# Patient Record
Sex: MALE | Race: WHITE | NOT HISPANIC OR LATINO | URBAN - METROPOLITAN AREA
[De-identification: names, ages, dates, MRNs, and addresses within clinical notes are randomized per-mention and may not be internally consistent; named-entity substitution may affect disease eponyms.]

---

## 2017-12-24 LAB
INFLUENZA A ANTIGEN, POC: POSITIVE
INFLUENZA B ANTIGEN, POC: NEGATIVE

## 2019-02-15 ENCOUNTER — HISTORICAL (OUTPATIENT)
Dept: ADMINISTRATIVE | Facility: HOSPITAL | Age: 49
End: 2019-02-15

## 2019-02-15 LAB
ABS NEUT (OLG): 3.2 X10(3)/MCL (ref 2.1–9.2)
ALBUMIN SERPL-MCNC: 4.5 GM/DL (ref 3.4–5)
ALBUMIN/GLOB SERPL: 2.25 {RATIO} (ref 1.5–2.5)
ALP SERPL-CCNC: 46 UNIT/L (ref 38–126)
ALT SERPL-CCNC: 16 UNIT/L (ref 7–52)
APPEARANCE, UA: CLEAR
AST SERPL-CCNC: 18 UNIT/L (ref 15–37)
BACTERIA #/AREA URNS AUTO: NORMAL /HPF
BILIRUB SERPL-MCNC: 0.8 MG/DL (ref 0.2–1)
BILIRUB UR QL STRIP: NEGATIVE MG/DL
BILIRUBIN DIRECT+TOT PNL SERPL-MCNC: 0.2 MG/DL (ref 0–0.5)
BILIRUBIN DIRECT+TOT PNL SERPL-MCNC: 0.6 MG/DL
BUN SERPL-MCNC: 18 MG/DL (ref 7–18)
CALCIUM SERPL-MCNC: 8.6 MG/DL (ref 8.5–10)
CHLORIDE SERPL-SCNC: 107 MMOL/L (ref 98–107)
CHOLEST SERPL-MCNC: 222 MG/DL (ref 0–200)
CHOLEST/HDLC SERPL: 4 {RATIO}
CO2 SERPL-SCNC: 28 MMOL/L (ref 21–32)
COLOR UR: YELLOW
CREAT SERPL-MCNC: 1.06 MG/DL (ref 0.6–1.3)
ERYTHROCYTE [DISTWIDTH] IN BLOOD BY AUTOMATED COUNT: 11.9 % (ref 11.5–17)
GLOBULIN SER-MCNC: 2 GM/DL (ref 1.2–3)
GLUCOSE (UA): NEGATIVE MG/DL
GLUCOSE SERPL-MCNC: 111 MG/DL (ref 74–106)
HCT VFR BLD AUTO: 42 % (ref 42–52)
HDLC SERPL-MCNC: 56 MG/DL (ref 35–60)
HGB BLD-MCNC: 13.8 GM/DL (ref 14–18)
HGB UR QL STRIP: NEGATIVE UNIT/L
KETONES UR QL STRIP: NEGATIVE MG/DL
LDLC SERPL CALC-MCNC: 126 MG/DL (ref 0–129)
LEUKOCYTE ESTERASE UR QL STRIP: NEGATIVE UNIT/L
LYMPHOCYTES # BLD AUTO: 2.2 X10(3)/MCL (ref 0.6–3.4)
LYMPHOCYTES NFR BLD AUTO: 37.3 % (ref 13–40)
MCH RBC QN AUTO: 30.5 PG (ref 27–31.2)
MCHC RBC AUTO-ENTMCNC: 33 GM/DL (ref 32–36)
MCV RBC AUTO: 93 FL (ref 80–94)
MONOCYTES # BLD AUTO: 0.5 X10(3)/MCL (ref 0.1–1.3)
MONOCYTES NFR BLD AUTO: 9.1 % (ref 0.1–24)
NEUTROPHILS NFR BLD AUTO: 53.6 % (ref 47–80)
NITRITE UR QL STRIP.AUTO: NEGATIVE
PH UR STRIP: 6.5 [PH]
PLATELET # BLD AUTO: 271 X10(3)/MCL (ref 130–400)
PMV BLD AUTO: 7.5 FL (ref 9.4–12.4)
POTASSIUM SERPL-SCNC: 4.5 MMOL/L (ref 3.5–5.1)
PROT SERPL-MCNC: 6.5 GM/DL (ref 6.4–8.2)
PROT UR QL STRIP: NEGATIVE MG/DL
PSA SERPL-MCNC: 0.46 NG/ML (ref 0–3.5)
RBC # BLD AUTO: 4.52 X10(6)/MCL (ref 4.7–6.1)
RBC #/AREA URNS HPF: NORMAL /HPF
SODIUM SERPL-SCNC: 140 MMOL/L (ref 136–145)
SP GR UR STRIP: 1.02
SQUAMOUS EPITHELIAL, UA: NORMAL /LPF
TRIGL SERPL-MCNC: 53 MG/DL (ref 30–150)
UROBILINOGEN UR STRIP-ACNC: 1 MG/DL
VLDLC SERPL CALC-MCNC: 10.6 MG/DL
WBC # SPEC AUTO: 5.9 X10(3)/MCL (ref 4.5–11.5)
WBC #/AREA URNS AUTO: NORMAL /[HPF]

## 2020-11-02 ENCOUNTER — HISTORICAL (OUTPATIENT)
Dept: ADMINISTRATIVE | Facility: HOSPITAL | Age: 50
End: 2020-11-02

## 2020-11-02 LAB
ABS NEUT (OLG): 3.4 X10(3)/MCL (ref 2.1–9.2)
ALBUMIN SERPL-MCNC: 4.2 GM/DL (ref 3.4–5)
ALBUMIN/GLOB SERPL: 1.91 {RATIO} (ref 1.5–2.5)
ALP SERPL-CCNC: 46 UNIT/L (ref 38–126)
ALT SERPL-CCNC: 15 UNIT/L (ref 7–52)
AST SERPL-CCNC: 15 UNIT/L (ref 15–37)
BILIRUB SERPL-MCNC: 0.7 MG/DL (ref 0.2–1)
BILIRUBIN DIRECT+TOT PNL SERPL-MCNC: 0.1 MG/DL (ref 0–0.5)
BILIRUBIN DIRECT+TOT PNL SERPL-MCNC: 0.6 MG/DL
BUN SERPL-MCNC: 17 MG/DL (ref 7–18)
CALCIUM SERPL-MCNC: 8.9 MG/DL (ref 8.5–10.1)
CHLORIDE SERPL-SCNC: 106 MMOL/L (ref 98–107)
CHOLEST SERPL-MCNC: 219 MG/DL (ref 0–200)
CHOLEST/HDLC SERPL: 3.9 {RATIO}
CO2 SERPL-SCNC: 24 MMOL/L (ref 21–32)
CREAT SERPL-MCNC: 1.07 MG/DL (ref 0.6–1.3)
ERYTHROCYTE [DISTWIDTH] IN BLOOD BY AUTOMATED COUNT: 12.1 % (ref 11.5–17)
GLOBULIN SER-MCNC: 2.1 GM/DL (ref 1.2–3)
GLUCOSE SERPL-MCNC: 113 MG/DL (ref 74–106)
HCT VFR BLD AUTO: 39.1 % (ref 42–52)
HDLC SERPL-MCNC: 56 MG/DL (ref 35–60)
HGB BLD-MCNC: 13.9 GM/DL (ref 14–18)
LDLC SERPL CALC-MCNC: 151 MG/DL (ref 0–129)
LYMPHOCYTES # BLD AUTO: 2.2 X10(3)/MCL (ref 0.6–3.4)
LYMPHOCYTES NFR BLD AUTO: 36.3 % (ref 13–40)
MCH RBC QN AUTO: 31 PG (ref 27–31.2)
MCHC RBC AUTO-ENTMCNC: 36 GM/DL (ref 32–36)
MCV RBC AUTO: 87 FL (ref 80–94)
MONOCYTES # BLD AUTO: 0.5 X10(3)/MCL (ref 0.1–1.3)
MONOCYTES NFR BLD AUTO: 8.5 % (ref 0.1–24)
NEUTROPHILS NFR BLD AUTO: 55.2 % (ref 47–80)
PLATELET # BLD AUTO: 299 X10(3)/MCL (ref 130–400)
PMV BLD AUTO: 7.6 FL (ref 9.4–12.4)
POTASSIUM SERPL-SCNC: 4.5 MMOL/L (ref 3.5–5.1)
PROT SERPL-MCNC: 6.4 GM/DL (ref 6.4–8.2)
PSA SERPL-MCNC: 0.55 NG/ML (ref 0–3.5)
RBC # BLD AUTO: 4.49 X10(6)/MCL (ref 4.7–6.1)
SODIUM SERPL-SCNC: 138 MMOL/L (ref 136–145)
TRIGL SERPL-MCNC: 66 MG/DL (ref 30–150)
VLDLC SERPL CALC-MCNC: 13.2 MG/DL
WBC # SPEC AUTO: 6.1 X10(3)/MCL (ref 4.5–11.5)

## 2020-11-04 ENCOUNTER — HISTORICAL (OUTPATIENT)
Dept: ADMINISTRATIVE | Facility: HOSPITAL | Age: 50
End: 2020-11-04

## 2020-11-04 LAB
APPEARANCE, UA: CLEAR
BACTERIA #/AREA URNS AUTO: NORMAL /HPF
BILIRUB UR QL STRIP: NEGATIVE MG/DL
COLOR UR: NORMAL
EST. AVERAGE GLUCOSE BLD GHB EST-MCNC: 94 MG/DL
GLUCOSE (UA): NEGATIVE MG/DL
HBA1C MFR BLD: 4.9 % (ref 4.4–6.4)
HGB UR QL STRIP: NEGATIVE UNIT/L
KETONES UR QL STRIP: NEGATIVE MG/DL
LEUKOCYTE ESTERASE UR QL STRIP: NEGATIVE UNIT/L
NITRITE UR QL STRIP.AUTO: NEGATIVE
PH UR STRIP: 6.5 [PH]
PROT UR QL STRIP: NEGATIVE MG/DL
RBC #/AREA URNS HPF: NORMAL /HPF
SP GR UR STRIP: 1.02
SQUAMOUS EPITHELIAL, UA: NORMAL /LPF
TSH SERPL-ACNC: 0.76 MIU/ML (ref 0.35–4.94)
UROBILINOGEN UR STRIP-ACNC: 0.2 MG/DL
WBC #/AREA URNS AUTO: NORMAL /[HPF]

## 2022-02-03 ENCOUNTER — HISTORICAL (OUTPATIENT)
Dept: ADMINISTRATIVE | Facility: HOSPITAL | Age: 52
End: 2022-02-03

## 2022-02-14 LAB — RHEUMATOID FACT SERPL-ACNC: <13 [IU]/ML

## 2022-02-17 LAB
ANTINUCLEAR ANTIBODY SCREEN (OHS): NEGATIVE
DSDNA AB QUANT (OHS): <0.5
DSDNA ANTIBODY (OHS): NEGATIVE

## 2022-05-02 NOTE — HISTORICAL [facility] CERNER
This is a historical note converted from [employer]. Formatting and pictures may have been removed.  Please reference [employer] for original formatting and attached multimedia. Chief Complaint  wellness  History of Present Illness  Medical student assessment:? Marlen Mckeon  50 year old ?male?presents to the office for complete wellness physical.?The patient?has?3 acute concerns today;  ?   1) cold intolerance - Patient reports always having to wear a jacket due to persistent cold intolerance for several months; denies weakness, fatigue and weight change  2) left axillary pain for 10 days - He reports scheduling todays appointment due to concern for a painful nodule in the left axilla which has since seemed to resolve. The patient denies?all of the following?associations regarding the nodule: fever, chills, nausea, vomiting, cough, SOB,?recent infection, overlying skin lesion  3)?inquiry for?anxiety/tremor control - The patient has had anxiety and essential tremor for years but more recently feels as though his symptoms occur more frequently, especially at work. The patient denies having either of these significantly impact his job performance or interpersonal relationships. He does, however, wish to be placed on a beta-blocker for symptom management.  ?   The patient carries history of essential tremor, impaired?fasting glucose, and recently biopsied facial actinic keratosis. Patient reports weekly moderate exercise including jogging 2-3 miles/day 3 days/week and playing golf weekly. Diet consists of some green leafy vegetable matter along with chicken, fish, red meat weekly. Patient reports decreasing carbohydrate consumption since last visit.?Current weight is 169lbs with previously?documented weight (June 2019) 166lbs. Patient has a family history of maternal type II diabetes. Patient is up to date on colonoscopy screening (March 2019) and vaccines aside from the flu and shingles vaccines.  ?   MD Note:  ?    The patient is a 50 year old white male here today for a complete Wellness physical.? The patient?has?multiple acute complaints today. The patient also carries a diagnosis of?situational anxiety and?past history of essential tremor, which is assessed today.? Exercise is reported as moderate and includes?but?some running with no chest pain or shortness of breath.?? Diet modifications are reported as?he follows a fairly good diet but he does admit to?increasing his red meat?about twice a week.?? Previous documented weight is recorded? as charted.  ?   PMHX, PSurgHX, PSocHX?reviewed at length by provider.? Updates and/or changes since last visit, as per HPI above in?student documentation.?  ?   Other Current?providers:  Avila Stockton-? [facility]  Review of Systems  Constitutional:?no weight gain,?no weight loss,?no fatigue,?no fever,?no chills,?no weakness,?no trouble sleeping.  Eyes:?no vision loss/changes,?no glasses or contacts,?no pain,?no redness,?no blurry or double vision,?no flashing lights,?no specks,?no glaucoma,?no cataracts.  Last eye exam:?within last 6 months  Head:?no headache,?no head injury,?no neck pain.?  Neck:??no lumps,?no swollen glands,?no stiffness.  Ears:?no decreased hearing,?no ringing,?no earache,?no drainage.?  Nose:?no stuffiness,?no discharge,?no itching,?no hay fever,?no nosebleeds,?no sinus pain.  Throat:?no bleeding,?no dentures,?no sore tongue,?no dry mouth,?no sore throat,?no hoarseness,?no thrush,?no non-healing sores.  Cardiovascular:?no chest pain or discomfort,?no tightness,?no palpitations,?no SOB with activity,?no difficulty breathing while supine,?no swelling,?no sudden awakening from sleep with SOB.  Vascular:?no calf pain with walking,?no leg cramping.  Respiratory:??no cough,?no sputum,?no coughing up blood,?no SOB,?no wheezing,?no painful breathing.  Gastrointestinal:?no swallowing difficulty,?no heartburn,?no change in appetite,?no nausea,?no change in bowel  habits,?no rectal bleeding,?no constipation,?no diarrhea,?no yellow eyes or skin.  Urinary:?no frequency,?no urgency,?no burning or pain,?no blood in urine,?no incontinence,?no change in urinary strength.  Musculoskeletal:?no muscle or joint pain,?no stiffness,?no back pain,?no redness of joints,?no swelling of joints,?no trauma.  Skin:?no rashes,?no lumps,?no itching,?no dryness,?color normal for ethnicity,?no hair or nail changes.? As per HPI lesions?per HPI  Neurologic:?no dizziness,?no fainting,?no seizures,?no weakness,?no numbness,?no tingling,?no tremors.? Tremor as per HPI  Psychiatric:?nervousness,?no stress,?no depression,?no memory loss.  Endocrine:?no heat or cold intolerance,?no sweating,?no frequent urination,?no thirst,?no change in appetite.  Hematologic:?no ease of bruising,?no ease of bleeding.  ?  ?  ?  ?  Physical Exam  Vitals & Measurements  BP:?132/80?  HT:?176.00?cm? WT:?77.000?kg? BMI:?24.86?  ?  ?  VITAL SIGNS:? Reviewed.? ?  GENERAL:? In?no apparent distress.? Alert and Oriented x3  HEAD:?No signsof head trauma. Normocephalic  EYES:? Pupils?equal/round/reactive.? Extraocular motionsintact.  EARS:? Hearing?grossly intact. TMs and EAC?clear  MOUTH:??Oropharynx is clear.?No erythema. No exudates  NECK:? No LAD. No JVD. No thyromegaly. No bruits  CHEST:? Chest with clear breath sounds bilaterally.? No wheezes, rales, or rhonchi. Good air movement  CARDIAC:? Regular rate and rhythm.? S1 and S2, without murmurs, gallops, or rubs.  VASCULAR:??No Edema.? Peripheral pulses normal and equal in all extremities.  ABDOMEN:?Soft, without detectable tenderness.??No sign of distention.?No rebound or guarding, and no masses palpated.? ?Bowel Sounds present and normal x 4.  MUSCULOSKELETAL:??Good range of motion of all major joints.?5/5 strength throughout. Extremities without clubbing, cyanosis or edema.  NEUROLOGIC EXAM:? Alert and oriented x 3.? No focal sensory or strength deficits.? ?Speech normal.?  Follows commands.? Essential tremor present while speaking?with the patient?at low lying level increased with?intention.  PSYCHIATRIC:? Mood normal.? Notably anxious within the office  SKIN:??No rash or lesions.  MERARI: Deferred by patient  ?  Assessment/Plan  1.?Wellness examination?Z00.00  1.?Wellness  ?  -Health and Exercise Prescription issued and educated upon  ?  -10% weight loss goal  ?  -Lifestyle counseling >20minutes  ?  -Diet: Lean proteins and increased vegetable matter; we advised him directly to decrease his red meats?as well as bad oils within his diet secondary to his elevated LDL  ?  -Screening:  ???? Colon UTD with Dr. Escobar  ???? Prostate deferred today by patient-patient is low risk per personal family history and he voiced guideline?understanding; PSA drawn-seem to be normal limit?with no?insidious or worrisome?elevation  ???? GYN/female N/A  ???? DEXA N/A  ???? Tobacco related-non-smoker  ?  -Vaccines: Shingrix No. 1 today-will return for #2 in?2 months;?patient defers flu vaccine-guidelines discussed  ?  -Labs:?See below  ?  2.Comorbidities:?See below  ?  3. Referrals:?None  ?  4. RTC: [? ]  Ordered:  Clinic Follow up, *Est. 11/04/21 3:00:00 CDT, Wellness, Order for future visit, Wellness examination, [facility]  Preventative Health Care Est 40-64 years [id number] PC, Wellness examination, [facility], 11/04/20 15:01:00 CST  Thyroid Stimulating Hormone, Routine collect, 11/04/20 15:21:00 CST, Blood, Stop date 11/04/20 15:21:00 CST, Lab Collect, add to current blood sample, Cold intolerance  Wellness examination, 11/04/20 15:21:00 CST  ?  2.?Essential tremor?G25.0  ?-HPI and physical exam correlate with diagnosis of essential tremor  -Patient neurovascularly stable?no indication for further imaging today  -Trial of propranolol initiated today side effects to start discussed at length?patient to report efficacy?within the next few weeks  -Document given to the patient on essential tremor for  education  Ordered:  propranolol, 40 mg = 1 tab(s), Oral, BID, PRN PRN tremor or anxiety, # 60 tab(s), 1 Refill(s), Pharmacy: [facility], 176, cm, Height/Length Dosing, 11/04/20 14:04:00 CST, 77, kg, Weight Dosing, 11/04/20 14:04:00 CST  ?  3.?Impaired fasting glucose?R73.01  ?-History of impaired fasting glucose  -Discussed need for decrease carb/starches/sugars?and patient voiced understanding  -We will add hemoglobin A1c to labs today?to check on 3-month average  ?  4.?History of actinic keratoses?Z87.2  ?-Left?mandibular region actinic keratosis being followed by?dermatology  -Follow-up with Derm for further evaluation  ?  5.?Left axillary pain?M79.622  ?-Physical exam for axillary region on the left showed no signs of?abnormalities/no masses or nodules  -May be correlated with patients recent use of?aromatic/aluminum-based?antiperspirant?deodorant, I recommended that he discontinue this type of deodorant?and move towards a low aluminum low?aromatic?such as bland Dove for men  -Monitor closely for change  ?  6.?Elevated LDL cholesterol level?E78.00  ?-Transient elevated LDL  -Extended discussion regarding decreasing red meats and bad oils and fried and fatty foods  -We will recheck this again in 12 months or sooner if clinically indicated  ?  ?-Note was?adjunct by?Marlen Mckeon?student-all components were modified or?originally?created by MD after assessment?of student component.  Referrals  Clinic Follow up, *Est. 11/04/21 3:00:00 CDT, Wellness, Order for future visit, Wellness examination, [facility]   Problem List/Past Medical History  Ongoing  Essential tremor  History of actinic keratoses  Impaired fasting glucose  Historical  No qualifying data  Procedure/Surgical History  Colonoscopy (03/19/2019)  Left foot 1st digit sx  Right elbow sx  Tonsillectomy   Medications  propranolol 40 mg oral tablet, 40 mg= 1 tab(s), Oral, BID, PRN, 1 refills  Allergies  penicillins  Social History  Abuse/Neglect  No,  07/08/2019  Alcohol  Substance Use  Tobacco  Never (less than 100 in lifetime), N/A, 07/08/2019  Family History  Primary malignant neoplasm of prostate: Father.  Immunizations  Vaccine Date Status   zoster vaccine, inactivated 11/04/2020 Given   tetanus/diphtheria/pertussis, acel(Tdap) 02/20/2019 Given   tetanus/diphtheria/pertussis, acel(Tdap) 03/04/2008 Recorded   Health Maintenance  Health Maintenance  ???Pending?(in the next year)  ??? ??Due?  ??? ? ? ?Influenza Vaccine due??10/01/20??and every 1??day(s)  ??? ? ? ?ADL Screening due??11/04/20??and every 1??year(s)  ??? ??Due In Future?  ??? ? ? ?Obesity Screening not due until??01/01/21??and every 1??year(s)  ??? ? ? ?Alcohol Misuse Screening not due until??01/02/21??and every 1??year(s)  ???Satisfied?(in the past 1 year)  ??? ??Satisfied?  ??? ? ? ?Alcohol Misuse Screening on??11/04/20.??Satisfied by Ryan Rodriguez MD  ??? ? ? ?Aspirin Therapy for CVD Prevention on??11/04/20.??Satisfied by Ryan Rodriguez MD  ??? ? ? ?Blood Pressure Screening on??11/04/20.??Satisfied by Millie Louise MA  ??? ? ? ?Body Mass Index Check on??11/04/20.??Satisfied by Millie Louise MA  ??? ? ? ?Depression Screening on??11/04/20.??Satisfied by Ryan Rodriguez MD  ??? ? ? ?Diabetes Screening on??11/04/20.??Satisfied by Catalina Dobson  ??? ? ? ?Lipid Screening on??11/02/20.??Satisfied by Padmini Mccracken  ??? ? ? ?Obesity Screening on??11/04/20.??Satisfied by Millie Louise MA  ?

## 2022-09-17 ENCOUNTER — HISTORICAL (OUTPATIENT)
Dept: ADMINISTRATIVE | Facility: HOSPITAL | Age: 52
End: 2022-09-17
Payer: COMMERCIAL

## 2023-06-26 PROBLEM — Z80.42 FAMILY HISTORY OF PROSTATE CANCER: Status: ACTIVE | Noted: 2023-06-26

## 2023-06-26 PROBLEM — R73.01 IMPAIRED FASTING GLUCOSE: Status: ACTIVE | Noted: 2023-06-26

## 2023-06-26 PROBLEM — Z87.2 HISTORY OF ACTINIC KERATOSES: Status: ACTIVE | Noted: 2023-06-26

## 2023-06-26 PROBLEM — E78.2 MIXED HYPERLIPIDEMIA: Status: ACTIVE | Noted: 2023-06-26

## 2023-06-26 PROBLEM — G25.0 ESSENTIAL TREMOR: Status: ACTIVE | Noted: 2023-06-26

## 2023-06-26 PROCEDURE — 86803 HEPATITIS C AB TEST: CPT | Performed by: FAMILY MEDICINE

## 2023-06-26 PROCEDURE — 87389 HIV-1 AG W/HIV-1&-2 AB AG IA: CPT | Performed by: FAMILY MEDICINE
